# Patient Record
Sex: FEMALE | Race: WHITE | NOT HISPANIC OR LATINO | URBAN - METROPOLITAN AREA
[De-identification: names, ages, dates, MRNs, and addresses within clinical notes are randomized per-mention and may not be internally consistent; named-entity substitution may affect disease eponyms.]

---

## 2023-11-09 ENCOUNTER — APPOINTMENT (OUTPATIENT)
Dept: URGENT CARE | Facility: CLINIC | Age: 63
End: 2023-11-09
Payer: OTHER GOVERNMENT

## 2023-11-10 ENCOUNTER — OFFICE VISIT (OUTPATIENT)
Dept: URGENT CARE | Facility: CLINIC | Age: 63
End: 2023-11-10
Payer: OTHER GOVERNMENT

## 2023-11-10 VITALS
DIASTOLIC BLOOD PRESSURE: 60 MMHG | BODY MASS INDEX: 21.97 KG/M2 | WEIGHT: 140 LBS | SYSTOLIC BLOOD PRESSURE: 120 MMHG | TEMPERATURE: 98 F | HEART RATE: 68 BPM | RESPIRATION RATE: 14 BRPM | HEIGHT: 67 IN | OXYGEN SATURATION: 98 %

## 2023-11-10 DIAGNOSIS — J02.9 PHARYNGITIS, UNSPECIFIED ETIOLOGY: ICD-10-CM

## 2023-11-10 DIAGNOSIS — Z20.818 EXPOSURE TO STREP THROAT: ICD-10-CM

## 2023-11-10 LAB — S PYO DNA SPEC NAA+PROBE: NOT DETECTED

## 2023-11-10 PROCEDURE — 99203 OFFICE O/P NEW LOW 30 MIN: CPT | Performed by: PHYSICIAN ASSISTANT

## 2023-11-10 PROCEDURE — 3078F DIAST BP <80 MM HG: CPT | Performed by: PHYSICIAN ASSISTANT

## 2023-11-10 PROCEDURE — 87651 STREP A DNA AMP PROBE: CPT | Performed by: PHYSICIAN ASSISTANT

## 2023-11-10 PROCEDURE — 3074F SYST BP LT 130 MM HG: CPT | Performed by: PHYSICIAN ASSISTANT

## 2023-11-10 RX ORDER — LEVOTHYROXINE SODIUM 88 UG/1
88 TABLET ORAL
COMMUNITY

## 2023-11-10 ASSESSMENT — ENCOUNTER SYMPTOMS
DIZZINESS: 0
DIARRHEA: 0
SORE THROAT: 1
ABDOMINAL PAIN: 0
MYALGIAS: 0
VOMITING: 0
NAUSEA: 0
FEVER: 0
COUGH: 0
CHILLS: 0

## 2023-11-10 NOTE — PROGRESS NOTES
"Subjective     Nuha Toro is a 63 y.o. female who presents with Pharyngitis (Sx minor sore throat x for the last 3 weeks, they have been around family members that tested POSITIVE for Strep )    HPI:  Nuha Toro is a 63 y.o. female who presents  today for evaluation of possible strep throat.  Patient reports that her son-in-law started to get sick with URI symptoms most 3 weeks ago but 2 days ago he actually tested positive for strep.  Her grandchildren also tested positive for strep.  Patient herself had a few days last week of mild sore throat with itchy/watery eyes but then symptoms completely dissipated.  She started to get mild sore throat again over the past 2 to 3 days.  She is here with her  and they both want to be tested for strep throat to rule it out so they can be treated before going around their  grandchild.      Review of Systems   Constitutional:  Negative for chills and fever.   HENT:  Positive for sore throat.    Respiratory:  Negative for cough.    Gastrointestinal:  Negative for abdominal pain, diarrhea, nausea and vomiting.   Musculoskeletal:  Negative for myalgias.   Neurological:  Negative for dizziness.           PMH:  has no past medical history on file.  MEDS:   Current Outpatient Medications:     levothyroxine (SYNTHROID) 88 MCG Tab, Take 88 mcg by mouth every morning on an empty stomach., Disp: , Rfl:     Atorvastatin Calcium (LIPITOR PO), Take 10 mg by mouth every day., Disp: , Rfl:   ALLERGIES: Not on File  SURGHX: No past surgical history on file.  SOCHX:    FH: Family history was reviewed, no pertinent findings to report        Objective     /60 (BP Location: Left arm, Patient Position: Sitting, BP Cuff Size: Adult)   Pulse 68   Temp 36.7 °C (98 °F) (Temporal)   Resp 14   Ht 1.689 m (5' 6.5\")   Wt 63.5 kg (140 lb)   SpO2 98%   BMI 22.26 kg/m²      Physical Exam  Constitutional:       Appearance: She is well-developed.   HENT:      Head: Normocephalic " and atraumatic.      Right Ear: Tympanic membrane, ear canal and external ear normal.      Left Ear: Tympanic membrane, ear canal and external ear normal.      Nose: Congestion present. No mucosal edema or rhinorrhea.      Mouth/Throat:      Lips: Pink.      Mouth: Mucous membranes are moist.      Pharynx: Uvula midline. No oropharyngeal exudate or posterior oropharyngeal erythema.   Eyes:      Conjunctiva/sclera: Conjunctivae normal.      Pupils: Pupils are equal, round, and reactive to light.   Cardiovascular:      Rate and Rhythm: Normal rate and regular rhythm.      Heart sounds: Normal heart sounds. No murmur heard.  Pulmonary:      Effort: Pulmonary effort is normal.      Breath sounds: Normal breath sounds. No wheezing.   Musculoskeletal:      Cervical back: Normal range of motion.   Lymphadenopathy:      Cervical: Cervical adenopathy present.   Skin:     General: Skin is warm and dry.      Capillary Refill: Capillary refill takes less than 2 seconds.   Neurological:      Mental Status: She is alert and oriented to person, place, and time.   Psychiatric:         Behavior: Behavior normal.         Judgment: Judgment normal.     POCT GROUP A STREP, PCR - Negative    Assessment & Plan       1. Pharyngitis, unspecified etiology  - POCT GROUP A STREP, PCR  -Supportive care discussed to include salt water gargles, throat lozenges, and increased fluid intake  PCR test negative.  No need for antibiotics.  Symptoms could be the start of some early viral illness or due to waxing waning over the past 2 weeks its possible that there is some allergic component as well.  If symptoms continue to worsen she can always return for reevaluation.    2. Exposure to strep throat  - POCT GROUP A STREP, PCR           Differential Diagnosis, natural history, and supportive care discussed. Return to the Urgent Care or follow up with your PCP if symptoms fail to resolve, or for any new or worsening symptoms. Emergency room precautions  discussed. Patient and/or family appears understanding of information.